# Patient Record
Sex: MALE | Race: WHITE | Employment: FULL TIME | URBAN - NONMETROPOLITAN AREA
[De-identification: names, ages, dates, MRNs, and addresses within clinical notes are randomized per-mention and may not be internally consistent; named-entity substitution may affect disease eponyms.]

---

## 2021-03-08 ENCOUNTER — TELEPHONE (OUTPATIENT)
Dept: FAMILY MEDICINE | Facility: OTHER | Age: 41
End: 2021-03-08

## 2021-03-08 NOTE — TELEPHONE ENCOUNTER
[facility] scheduled March 23, Loyd needs fasting lab orders.        Estela Lopes on 3/8/2021 at 1:33 PM

## 2021-03-24 ENCOUNTER — RESULTS ONLY (OUTPATIENT)
Dept: LAB | Age: 41
End: 2021-03-24

## 2021-03-24 ENCOUNTER — APPOINTMENT (OUTPATIENT)
Dept: LAB | Facility: OTHER | Age: 41
End: 2021-03-24
Attending: CHIROPRACTOR

## 2021-03-24 ENCOUNTER — APPOINTMENT (OUTPATIENT)
Dept: FAMILY MEDICINE | Facility: OTHER | Age: 41
End: 2021-03-24
Attending: CHIROPRACTOR

## 2021-03-24 LAB
ALBUMIN SERPL-MCNC: 4.6 G/DL (ref 3.5–5.7)
ALBUMIN UR-MCNC: NEGATIVE MG/DL
ALP SERPL-CCNC: 50 U/L (ref 34–104)
ALT SERPL W P-5'-P-CCNC: 37 U/L (ref 7–52)
ANION GAP SERPL CALCULATED.3IONS-SCNC: 6 MMOL/L (ref 3–14)
APPEARANCE UR: CLEAR
AST SERPL W P-5'-P-CCNC: 35 U/L (ref 13–39)
BILIRUB SERPL-MCNC: 1.1 MG/DL (ref 0.3–1)
BILIRUB UR QL STRIP: NEGATIVE
BUN SERPL-MCNC: 21 MG/DL (ref 7–25)
CALCIUM SERPL-MCNC: 9.7 MG/DL (ref 8.6–10.3)
CHLORIDE SERPL-SCNC: 102 MMOL/L (ref 98–107)
CHOLEST SERPL-MCNC: 171 MG/DL
CO2 SERPL-SCNC: 31 MMOL/L (ref 21–31)
COLOR UR AUTO: NORMAL
CREAT SERPL-MCNC: 1.26 MG/DL (ref 0.7–1.3)
GFR SERPL CREATININE-BSD FRML MDRD: 63 ML/MIN/{1.73_M2}
GLUCOSE SERPL-MCNC: 98 MG/DL (ref 70–105)
GLUCOSE UR STRIP-MCNC: NEGATIVE MG/DL
HDLC SERPL-MCNC: 38 MG/DL (ref 23–92)
HGB UR QL STRIP: NEGATIVE
KETONES UR STRIP-MCNC: NEGATIVE MG/DL
LDLC SERPL CALC-MCNC: 115 MG/DL
LEUKOCYTE ESTERASE UR QL STRIP: NEGATIVE
NITRATE UR QL: NEGATIVE
NONHDLC SERPL-MCNC: 133 MG/DL
PH UR STRIP: 6 PH (ref 5–7)
POTASSIUM SERPL-SCNC: 4.6 MMOL/L (ref 3.5–5.1)
PROT SERPL-MCNC: 6.8 G/DL (ref 6.4–8.9)
SODIUM SERPL-SCNC: 139 MMOL/L (ref 134–144)
SOURCE: NORMAL
SP GR UR STRIP: 1.01 (ref 1–1.03)
TRIGL SERPL-MCNC: 91 MG/DL
UROBILINOGEN UR STRIP-MCNC: NORMAL MG/DL (ref 0–2)

## 2021-03-24 PROCEDURE — 93000 ELECTROCARDIOGRAM COMPLETE: CPT | Performed by: INTERNAL MEDICINE

## 2021-03-24 PROCEDURE — 99499 UNLISTED E&M SERVICE: CPT | Performed by: CHIROPRACTOR

## 2021-03-24 RX ORDER — ASPIRIN 81 MG/1
81 TABLET, CHEWABLE ORAL DAILY
COMMUNITY

## 2021-03-24 RX ORDER — MULTIVITAMIN,THERAPEUTIC
1 TABLET ORAL DAILY
COMMUNITY